# Patient Record
Sex: MALE | Race: BLACK OR AFRICAN AMERICAN | Employment: UNEMPLOYED | URBAN - METROPOLITAN AREA
[De-identification: names, ages, dates, MRNs, and addresses within clinical notes are randomized per-mention and may not be internally consistent; named-entity substitution may affect disease eponyms.]

---
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
## 2021-04-17 ENCOUNTER — HOSPITAL ENCOUNTER (EMERGENCY)
Age: 21
Discharge: HOME | End: 2021-04-17
Attending: EMERGENCY MEDICINE
Payer: COMMERCIAL

## 2021-04-17 VITALS
DIASTOLIC BLOOD PRESSURE: 82 MMHG | TEMPERATURE: 98.2 F | RESPIRATION RATE: 18 BRPM | BODY MASS INDEX: 39.87 KG/M2 | WEIGHT: 254 LBS | HEIGHT: 67 IN | HEART RATE: 64 BPM | OXYGEN SATURATION: 100 % | SYSTOLIC BLOOD PRESSURE: 144 MMHG

## 2021-04-17 DIAGNOSIS — J45.20 MILD INTERMITTENT ASTHMA, UNSPECIFIED WHETHER COMPLICATED: ICD-10-CM

## 2021-04-17 DIAGNOSIS — J30.9 CHRONIC ALLERGIC RHINITIS: Primary | ICD-10-CM

## 2021-04-17 PROCEDURE — 99283 EMERGENCY DEPT VISIT LOW MDM: CPT

## 2021-04-17 RX ORDER — FLUTICASONE PROPIONATE 50 MCG
1 SPRAY, SUSPENSION (ML) NASAL DAILY
COMMUNITY

## 2021-04-17 NOTE — ED PROVIDER NOTES
[address] [facility]          ATTENDING PHYSICIAN NOTE       Date of evaluation: 4/17/2021    Chief Complaint     Other (c/o allergy symptoms. does not think his current medications work )      History of Present Illness     Sonja Castillo is a 21 y.o. male who presents the emergency department with complaints of nasal congestion. Patient reports approximately yearly bouts of severe nasal congestion rhinorrhea as well as difficulty breathing which he relates to asthma. Patient reports has been taking Flonase as well as cetirizine and his albuterol inhaler with limited relief of his symptoms. Denies any new or recent illnesses denies any chest pain with exertion he has complaints of primarily of nasal congestion difficulty sleeping at night secondary to his nasal congestion    Review of Systems     As documented in the HPI, otherwise all other systems were reviewed and were negative. Past Medical, Surgical, Family, and Social History     He has a past medical history of Asthma. He has no past surgical history on file. His family history is not on file. He reports that he has never smoked. He has never used smokeless tobacco. He reports current drug use. Drug: Marijuana. He reports that he does not drink alcohol. Medications     Previous Medications    ALBUTEROL (ACCUNEB) 1.25 MG/3ML NEBULIZER SOLUTION    Inhale 1 ampule into the lungs every 6 hours as needed for Wheezing    CETIRIZINE HCL (ZYRTEC PO)    Take by mouth    FLUTICASONE (FLONASE) 50 MCG/ACT NASAL SPRAY    1 spray by Each Nostril route daily    LORATADINE-PSEUDOEPHEDRINE (CLARITIN-D 12 HOUR) 5-120 MG PER EXTENDED RELEASE TABLET    Take 1 tablet by mouth 2 times daily    NAPROXEN (NAPROSYN) 500 MG TABLET    Take 1 tablet by mouth 2 times daily for 20 doses       Allergies     He has No Known Allergies.     Physical Exam     INITIAL VITALS: BP: (!) 144/82, Temp: 98.2 °F (36.8 °C), Pulse: 64, Resp: 18, SpO2: 100 % General: 80-year-old male sitting in bed no apparent cardiorespiratory distress  HEENT:  head is atraumatic, pupils equal round and reactive to light, sclera are clear, oropharynx is nonerythematous, there is hyperemia of the nasal turbinates bilaterally right worse than left the tympanic membranes are clear bilaterally without significant erythema or bulging  Neck: supple, no lymphadenopathy  Chest: nonlabored respirations, equal chest rise bilaterally, no accessory muscle use, clear to auscultation bilaterally without wheezes rhonchi or rales  Cardiovascular: Regular, rate, and rhythm, 2+ radial pulses bilaterally, capillary refill 2 seconds  Abdominal: Soft, nontender, nondistended, positive bowel sounds throughout, no rebound or guarding  Skin: Warm, dry well perfused, no rashes  Musculoskeletal: no obvious deformities, no tenderness to palpation diffusely  Neurologic:  alert and oriented x4, speech is clear and intact without dysarthria, gait is intact    Diagnostic Results     RADIOLOGY:  No orders to display       LABS:   No results found for this visit on 04/17/21. RECENT VITALS:  BP: (!) 144/82, Temp: 98.2 °F (36.8 °C), Pulse: 64, Resp: 18, SpO2: 100 %       ED Course     Nursing Notes, Past Medical Hx, Past Surgical Hx, Social Hx, Allergies, and Family Hx were reviewed. The patient was given the following medications:  Orders Placed This Encounter   Medications    beclomethasone (QVAR) 80 MCG/ACT inhaler     Sig: Inhale 1 puff into the lungs 2 times daily     Dispense:  1 Inhaler     Refill:  3       CONSULTS:  None    MEDICAL DECISION MAKING / ASSESSMENT / Soila Nona is a 21 y.o. male who presented to the emergency department with a complaint of chronic nasal congestion and recurrent nasal congestion during the spring. Also complains of some difficulty breathing which he related is being secondary to asthma symptoms.   Was not actively experiencing difficulty breathing while here in

## 2021-04-17 NOTE — ED NOTES
.Patient prepared for and ready to be discharged. Patient discharged at this time in no acute distress after verbalizing understanding of discharge instructions. Patient left after receiving After Visit Summary instructions.       Robert Delgado RN  04/17/21 7038

## 2021-10-06 ENCOUNTER — HOSPITAL ENCOUNTER (EMERGENCY)
Age: 21
Discharge: HOME | End: 2021-10-06
Attending: EMERGENCY MEDICINE
Payer: COMMERCIAL

## 2021-10-06 VITALS
SYSTOLIC BLOOD PRESSURE: 134 MMHG | HEIGHT: 67 IN | BODY MASS INDEX: 39.55 KG/M2 | WEIGHT: 252 LBS | DIASTOLIC BLOOD PRESSURE: 75 MMHG | HEART RATE: 65 BPM | OXYGEN SATURATION: 99 % | TEMPERATURE: 99.1 F | RESPIRATION RATE: 16 BRPM

## 2021-10-06 DIAGNOSIS — R09.81 NASAL CONGESTION: Primary | ICD-10-CM

## 2021-10-06 DIAGNOSIS — J30.89 SEASONAL ALLERGIC RHINITIS DUE TO OTHER ALLERGIC TRIGGER: ICD-10-CM

## 2021-10-06 PROCEDURE — 99283 EMERGENCY DEPT VISIT LOW MDM: CPT

## 2021-10-06 RX ORDER — CETIRIZINE HYDROCHLORIDE 10 MG/1
10 TABLET ORAL DAILY
Qty: 30 TABLET | Refills: 1 | Status: SHIPPED | OUTPATIENT
Start: 2021-10-06 | End: 2021-12-05

## 2021-10-06 ASSESSMENT — PAIN SCALES - GENERAL: PAINLEVEL_OUTOF10: 0

## 2021-10-06 NOTE — ED PROVIDER NOTES
[facility]          ATTENDING PHYSICIAN NOTE       Date of evaluation: 10/6/2021    Chief Complaint     Nasal Congestion and Medication Refill (Zyrtec)      History of Present Illness     Heather Matias is a 24 y.o. male who presents with nasal congestion and some wheezing. Patient dates for the past week or so has had some nasal congestion which is typical for him with his seasonal allergies, he is also noted some wheezing and coughing which is been using his albuterol inhaler for with relief. No fevers noted. No known exposures to infections. Normal taste and smell. Patient is a smoker. Able to sleep at night but occasional coughing. Review of Systems     Review of Systems--positive for cough. Positive for wheezing. Negative for fevers or chills. Negative for change in taste or smell. Negative for productive cough of bloody or tinted sputum    Past Medical, Surgical, Family, and Social History     He has a past medical history of Asthma. He has no past surgical history on file. His family history is not on file. He reports that he has never smoked. He has never used smokeless tobacco. He reports current drug use. Drug: Marijuana. He reports that he does not drink alcohol. Medications     Previous Medications    ALBUTEROL (ACCUNEB) 1.25 MG/3ML NEBULIZER SOLUTION    Inhale 1 ampule into the lungs every 6 hours as needed for Wheezing    BECLOMETHASONE (QVAR) 80 MCG/ACT INHALER    Inhale 1 puff into the lungs 2 times daily    CETIRIZINE HCL (ZYRTEC PO)    Take by mouth    FLUTICASONE (FLONASE) 50 MCG/ACT NASAL SPRAY    1 spray by Each Nostril route daily    LORATADINE-PSEUDOEPHEDRINE (CLARITIN-D 12 HOUR) 5-120 MG PER EXTENDED RELEASE TABLET    Take 1 tablet by mouth 2 times daily    NAPROXEN (NAPROSYN) 500 MG TABLET    Take 1 tablet by mouth 2 times daily for 20 doses       Allergies     He has No Known Allergies.     Physical Exam     INITIAL VITALS: BP: 134/75, Temp: 99.1 °F (37.3 °C), Pulse: 65, Resp: 16, SpO2: 99 %   Physical Exam  General--sitting up in bed, no respiratory distress  HEENT--no signs of trauma, pupils are equal round react light, oropharynx is clear and moist without any posterior pharyngeal swelling, there is some nasal rhinorrhea but no purulent discharge noted  Neck--good range of motion, trachea midline  Chest--equal breath sounds bilaterally, no wheezing noted  Heart--regular rate and rhythm, not tachycardic, no murmurs  Abdomen--soft, no focal pain on palpation  Extremities--no deformities, moves all 4 equally  Neuro--alert, oriented, clear speech, no facial asymmetry, no focal weakness  Psych--appropriate affect and mood  Diagnostic Results       RADIOLOGY:  No orders to display       LABS:   No results found for this visit on 10/06/21. RECENT VITALS:  BP: 134/75,Temp: 99.1 °F (37.3 °C), Pulse: 65, Resp: 16, SpO2: 99 %     Procedures         ED Course     Nursing Notes, Past Medical Hx, Past Surgical Hx, Social Hx,Allergies, and Family Hx were reviewed. patient was given the following medications:  No orders of the defined types were placed in this encounter. CONSULTS:  None    MEDICAL DECISIONMAKING / ASSESSMENT / PLAN     Stephanie Sharma is a 24 y.o. male comes in with nasal congestion and occasional wheezing. He has no signs or symptoms of upper respiratory infection at this time, no fevers, no loss of taste or smell. He is requesting to put back on his certrizine, I do not think he needs prednisone at this time, we will keep him off work for 1 day and return to work on Thursday. Based on exam vital signs of like a chest x-ray is necessary or antibiotics. Clinical Impression     Seasonal rhinitis    Seasonal allergies    Disposition     PATIENT REFERRED TO:  No follow-up provider specified.     DISCHARGE MEDICATIONS:  New Prescriptions    No medications on file       [address] MD  10/06/21 5725

## 2021-10-06 NOTE — ED TRIAGE NOTES
Pt c/o mild asthma and seasonal allergies lately. Seen yesterday at urgent care and received an inhaler prescription and allegra. He spoke with the NP at urgent care, requesting Zyrtec, and was told he would have to see a doctor for that.

## 2021-10-06 NOTE — Clinical Note
Allyson Mae was seen and treated in our emergency department on 10/6/2021. He may return to work on 10/07/2021. If you have any questions or concerns, please don't hesitate to call.       Sammie Arboleda MD

## 2022-02-24 ENCOUNTER — HOSPITAL ENCOUNTER (EMERGENCY)
Age: 22
Discharge: HOME | End: 2022-02-24
Attending: STUDENT IN AN ORGANIZED HEALTH CARE EDUCATION/TRAINING PROGRAM
Payer: COMMERCIAL

## 2022-02-24 VITALS
HEIGHT: 67 IN | WEIGHT: 252 LBS | OXYGEN SATURATION: 100 % | TEMPERATURE: 98.8 F | HEART RATE: 84 BPM | SYSTOLIC BLOOD PRESSURE: 141 MMHG | RESPIRATION RATE: 18 BRPM | BODY MASS INDEX: 39.55 KG/M2 | DIASTOLIC BLOOD PRESSURE: 79 MMHG

## 2022-02-24 DIAGNOSIS — J45.909 ASTHMA, UNSPECIFIED ASTHMA SEVERITY, UNSPECIFIED WHETHER COMPLICATED, UNSPECIFIED WHETHER PERSISTENT: Primary | ICD-10-CM

## 2022-02-24 PROCEDURE — 99283 EMERGENCY DEPT VISIT LOW MDM: CPT

## 2022-02-24 RX ORDER — ALBUTEROL SULFATE 90 UG/1
2 AEROSOL, METERED RESPIRATORY (INHALATION) 4 TIMES DAILY PRN
Qty: 54 G | Refills: 1 | Status: SHIPPED | OUTPATIENT
Start: 2022-02-24 | End: 2022-06-29 | Stop reason: SDUPTHER

## 2022-02-24 ASSESSMENT — ENCOUNTER SYMPTOMS
ABDOMINAL DISTENTION: 0
SINUS PAIN: 0
SINUS PRESSURE: 0
ALLERGIC/IMMUNOLOGIC NEGATIVE: 1
SHORTNESS OF BREATH: 0
COUGH: 0
DIARRHEA: 0
RHINORRHEA: 0
SORE THROAT: 0
BACK PAIN: 0
NAUSEA: 0
CONSTIPATION: 0
VOMITING: 0
CHEST TIGHTNESS: 0
ABDOMINAL PAIN: 0
WHEEZING: 0

## 2022-02-24 NOTE — Clinical Note
Josephine Carvalho was seen and treated in our emergency department on 2/24/2022. He may return to work on 02/25/2022. If you have any questions or concerns, please don't hesitate to call.       Susan Cobb, DO

## 2022-02-25 NOTE — ED PROVIDER NOTES
[address] [facility] RESIDENT NOTE       Date of evaluation: 2/24/2022    Chief Complaint     Asthma (having astma issues states was weezing states that he used the last of his inhaler. )      History of Present Illness     Jim Henriquez is a 24 y.o. male who presents with asthma. Patient states he has shortness of breath, however when he was inhaler usually resolves appropriately. Patient was feeling dyspneic earlier today however he uses inhaler a few hours before arriving. Upon presentation to the ED his shortness of breath is resolved, however he has run out of his inhaler today. At this time patient is not complaining of shortness of breath and is breathing fine. Patient denies chest pain, abdominal pain, nausea/vomiting, constipation/diarrhea, urinary/bladder symptoms. Review of Systems     Review of Systems   Constitutional: Negative for chills, fatigue and fever. HENT: Negative. Negative for congestion, ear pain, postnasal drip, rhinorrhea, sinus pressure, sinus pain, sneezing and sore throat. Respiratory: Negative for cough, chest tightness, shortness of breath and wheezing. Cardiovascular: Negative for chest pain, palpitations and leg swelling. Gastrointestinal: Negative for abdominal distention, abdominal pain, constipation, diarrhea, nausea and vomiting. Endocrine: Negative for polyuria. Genitourinary: Negative for difficulty urinating, dysuria, flank pain, hematuria, penile pain and testicular pain. Musculoskeletal: Negative for arthralgias, back pain, myalgias, neck pain and neck stiffness. Skin: Negative. Allergic/Immunologic: Negative. Neurological: Negative for dizziness, seizures, weakness and numbness. Hematological: Negative. Psychiatric/Behavioral: Negative. Past Medical, Surgical, Family, and Social History     He has a past medical history of Asthma. He has no past surgical history on file.   His family history is not on file. He reports that he has never smoked. He has never used smokeless tobacco. He reports current drug use. Drug: Marijuana Lanis Ayaka). He reports that he does not drink alcohol. Medications     Previous Medications    ALBUTEROL (ACCUNEB) 1.25 MG/3ML NEBULIZER SOLUTION    Inhale 1 ampule into the lungs every 6 hours as needed for Wheezing    BECLOMETHASONE (QVAR) 80 MCG/ACT INHALER    Inhale 1 puff into the lungs 2 times daily    CETIRIZINE HCL (ZYRTEC PO)    Take by mouth    FLUTICASONE (FLONASE) 50 MCG/ACT NASAL SPRAY    1 spray by Each Nostril route daily    LORATADINE-PSEUDOEPHEDRINE (CLARITIN-D 12 HOUR) 5-120 MG PER EXTENDED RELEASE TABLET    Take 1 tablet by mouth 2 times daily    NAPROXEN (NAPROSYN) 500 MG TABLET    Take 1 tablet by mouth 2 times daily for 20 doses       Allergies     He has No Known Allergies. Physical Exam     INITIAL VITALS: BP: (!) 141/79, Temp: 98.8 °F (37.1 °C), Pulse: 84, Resp: 18, SpO2: 100 %   Physical Exam  Constitutional:       General: He is not in acute distress. Appearance: He is obese. He is not ill-appearing, toxic-appearing or diaphoretic. HENT:      Head: Normocephalic and atraumatic. Right Ear: External ear normal.      Left Ear: External ear normal.      Nose: Nose normal.      Mouth/Throat:      Mouth: Mucous membranes are moist.      Pharynx: Oropharynx is clear. Eyes:      General: No scleral icterus. Right eye: No discharge. Left eye: No discharge. Extraocular Movements: Extraocular movements intact. Pupils: Pupils are equal, round, and reactive to light. Cardiovascular:      Rate and Rhythm: Normal rate and regular rhythm. Heart sounds: Normal heart sounds. No murmur heard. No friction rub. No gallop. Pulmonary:      Effort: Pulmonary effort is normal. No respiratory distress. Breath sounds: Normal breath sounds. No stridor. No wheezing, rhonchi or rales.    Abdominal:      General: Bowel sounds are normal. There is no distension. Palpations: Abdomen is soft. There is no mass. Tenderness: There is no abdominal tenderness. There is no guarding or rebound. Hernia: No hernia is present. Musculoskeletal:      Cervical back: Normal range of motion. Right lower leg: No edema. Left lower leg: No edema. Skin:     General: Skin is warm and dry. Coloration: Skin is not jaundiced or pale. Neurological:      Mental Status: He is alert and oriented to person, place, and time. Mental status is at baseline. Psychiatric:         Mood and Affect: Mood normal.         Behavior: Behavior normal.         Thought Content: Thought content normal.         Judgment: Judgment normal.         Diagnostic Results     EKG       RADIOLOGY:  No orders to display       LABS:   No results found for this visit on 02/24/22. ED BEDSIDE ULTRASOUND:      RECENT VITALS:  BP: (!) 141/79, Temp: 98.8 °F (37.1 °C),Pulse: 84, Resp: 18, SpO2: 100 %     Procedures         ED Course     Nursing Notes, Past Medical Hx, Past Surgical Hx, Social Hx, Allergies, and FamilyHx were reviewed. The patient was giventhe following medications:  Orders Placed This Encounter   Medications    albuterol sulfate HFA (VENTOLIN HFA) 108 (90 Base) MCG/ACT inhaler     Sig: Inhale 2 puffs into the lungs 4 times daily as needed for Wheezing     Dispense:  54 g     Refill:  1       CONSULTS:  None    MEDICAL DECISION MAKING / ASSESSMENT / August Mayer is a 24 y.o. male who presented to the ED with asthma. Patient has shortness of breath, and uses inhaler as needed before presenting to the ED. Upon presentation his shortness of breath and wheezing has resolved. Patient states that he has run out of his inhaler, needs a prescription for additional albuterol inhaler. On presentation to the ED, vital signs are stable.   Patient is afebrile, heart rate 84, respiratory rate 18, satting 100% on room air, mildly hypertensive to 141/79. On physical exam patient is without wheezing or shortness of breath. When offered a breathing treatment, patient refuses. Patient does ask for a prescription for albuterol as well as a work note which was provided. Patient given a prescription for albuterol inhaler, as well as a work note to return to work tomorrow 2/25/2022. If patient's shortness of breath continues or significantly worsens despite albuterol inhaler use, patient is instructed to return to the ED for further evaluation and management. This patient was also evaluated by the attending physician. All care plans were discussed and agreed upon. Clinical Impression     1. Asthma, unspecified asthma severity, unspecified whether complicated, unspecified whether persistent        Disposition     PATIENT REFERRED TO:  No follow-up provider specified.     DISCHARGE MEDICATIONS:  New Prescriptions    ALBUTEROL SULFATE HFA (VENTOLIN HFA) 108 (90 BASE) MCG/ACT INHALER    Inhale 2 puffs into the lungs 4 times daily as needed for Wheezing       DISPOSITION    Discharge home            Dagoberto Tony,   Resident  02/24/22 3868

## 2022-02-25 NOTE — ED PROVIDER NOTES
ED Attending Attestation Note     Date of evaluation: 2/24/2022    This patient was seen by the resident. I have seen and examined the patient, agree with the workup, evaluation, management and diagnosis. The care plan has been discussed. My assessment reveals a 26-year-old male presenting with chief complaint of an asthma exacerbation. He sees his inhaler prior to coming however is out of puffs in his inhaler and is requesting a refill.   On physical exam no wheezing, pulse ox 98 to 100% at rest and with exertion, will discharge with butyryl inhaler prescription, patient requests no further treatment or work-up at this time     Letty Gonzalez MD  02/24/22 6464

## 2022-06-29 ENCOUNTER — HOSPITAL ENCOUNTER (EMERGENCY)
Age: 22
Discharge: HOME | End: 2022-06-29
Payer: COMMERCIAL

## 2022-06-29 VITALS
WEIGHT: 252 LBS | OXYGEN SATURATION: 100 % | SYSTOLIC BLOOD PRESSURE: 128 MMHG | HEIGHT: 68 IN | RESPIRATION RATE: 14 BRPM | BODY MASS INDEX: 38.19 KG/M2 | HEART RATE: 59 BPM | TEMPERATURE: 98.5 F | DIASTOLIC BLOOD PRESSURE: 71 MMHG

## 2022-06-29 DIAGNOSIS — K01.1 IMPACTED TOOTH: ICD-10-CM

## 2022-06-29 DIAGNOSIS — K04.7 DENTAL INFECTION: Primary | ICD-10-CM

## 2022-06-29 PROCEDURE — 99283 EMERGENCY DEPT VISIT LOW MDM: CPT

## 2022-06-29 RX ORDER — AMOXICILLIN 500 MG/1
500 CAPSULE ORAL 2 TIMES DAILY
Qty: 14 CAPSULE | Refills: 0 | Status: SHIPPED | OUTPATIENT
Start: 2022-06-29 | End: 2022-07-06

## 2022-06-29 RX ORDER — IBUPROFEN 800 MG/1
800 TABLET ORAL EVERY 8 HOURS PRN
Qty: 30 TABLET | Refills: 0 | Status: SHIPPED | OUTPATIENT
Start: 2022-06-29

## 2022-06-29 RX ORDER — ALBUTEROL SULFATE 90 UG/1
2 AEROSOL, METERED RESPIRATORY (INHALATION) 4 TIMES DAILY PRN
Qty: 54 G | Refills: 0 | Status: SHIPPED | OUTPATIENT
Start: 2022-06-29

## 2022-06-29 ASSESSMENT — PAIN DESCRIPTION - ONSET: ONSET: PROGRESSIVE

## 2022-06-29 ASSESSMENT — PAIN DESCRIPTION - ORIENTATION: ORIENTATION: LEFT;LOWER;POSTERIOR

## 2022-06-29 ASSESSMENT — ENCOUNTER SYMPTOMS
SORE THROAT: 0
SINUS PAIN: 0
CHEST TIGHTNESS: 0
FACIAL SWELLING: 1
SINUS PRESSURE: 0
SHORTNESS OF BREATH: 0

## 2022-06-29 ASSESSMENT — PAIN DESCRIPTION - LOCATION: LOCATION: TEETH

## 2022-06-29 ASSESSMENT — PAIN DESCRIPTION - DESCRIPTORS: DESCRIPTORS: THROBBING

## 2022-06-29 ASSESSMENT — PAIN - FUNCTIONAL ASSESSMENT
PAIN_FUNCTIONAL_ASSESSMENT: PREVENTS OR INTERFERES SOME ACTIVE ACTIVITIES AND ADLS
PAIN_FUNCTIONAL_ASSESSMENT: 0-10

## 2022-06-29 ASSESSMENT — PAIN DESCRIPTION - FREQUENCY: FREQUENCY: INTERMITTENT

## 2022-06-29 ASSESSMENT — PAIN DESCRIPTION - PAIN TYPE: TYPE: ACUTE PAIN

## 2022-06-29 ASSESSMENT — PAIN SCALES - GENERAL: PAINLEVEL_OUTOF10: 9

## 2022-06-29 NOTE — ED TRIAGE NOTES
Pt presents to ED for tooth pain in lower left jaw and is concerned for infection. Pain started approx 4 days ago.

## 2022-06-29 NOTE — ED NOTES
Discharge instructions and prescriptions reviewed with patient. Pt verbalized understanding. Pt discharged home by self.        Julito Art RN  06/29/22 1038

## 2022-06-29 NOTE — ED PROVIDER NOTES
[address] ENCOUNTER          PHYSICIAN ASSISTANT NOTE       Date of evaluation: 6/29/2022    Chief Complaint     Dental Pain (lower left)    History of Present Illness     Ignacio Diaz is a 25 y.o. male presenting to the emergency department for evaluation of left lower dental pain and jaw pain. Symptoms started about 4 days ago with pain in his left lower wisdom tooth. He believes that his wisdom teeth are trying to come in, and is concerned that 1 may be infected. He has had pain like this in the past, that resolved after taking ibuprofen and only lasted a few days. Due to the persistence and developing left-sided jaw swelling he decided to come to the emergency department for evaluation. No associated fevers or chills. No trauma or injury. He has no difficulty opening his mouth, maintaining his secretions, no shortness of breath. Did try to call a dentist, but was not able to get an appointment for about 4 months. Review of Systems     Review of Systems   Constitutional: Negative for chills, diaphoresis, fatigue and fever. HENT: Positive for dental problem and facial swelling. Negative for congestion, sinus pressure, sinus pain and sore throat. Respiratory: Negative for chest tightness and shortness of breath. Cardiovascular: Negative for chest pain. Neurological: Positive for headaches. Negative for dizziness. Psychiatric/Behavioral: Negative for confusion. Past Medical, Surgical, Family, and Social History     He has a past medical history of Asthma. He has no past surgical history on file. His family history is not on file. He reports that he has never smoked. He has never used smokeless tobacco. He reports current drug use. Drug: Marijuana Jing Kwon). He reports that he does not drink alcohol.     Medications     Discharge Medication List as of 6/29/2022 10:32 AM      CONTINUE these medications which have NOT CHANGED    Details   fluticasone (FLONASE) 50 MCG/ACT nasal spray 1 spray by Each Nostril route dailyHistorical Med      Cetirizine HCl (ZYRTEC PO) Take by mouthHistorical Med      beclomethasone (QVAR) 80 MCG/ACT inhaler Inhale 1 puff into the lungs 2 times daily, Disp-1 Inhaler, R-3Print      albuterol (ACCUNEB) 1.25 MG/3ML nebulizer solution Inhale 1 ampule into the lungs every 6 hours as needed for WheezingHistorical Med      loratadine-pseudoephedrine (CLARITIN-D 12 HOUR) 5-120 MG per extended release tablet Take 1 tablet by mouth 2 times daily, Disp-20 tablet, R-0Print      naproxen (NAPROSYN) 500 MG tablet Take 1 tablet by mouth 2 times daily for 20 doses, Disp-20 tablet, R-0Print             Allergies     He has No Known Allergies. Physical Exam     INITIAL VITALS: BP: 128/71, Temp: 98.5 °F (36.9 °C), Heart Rate: 59, Resp: 14, SpO2: 100 %  Physical Exam  Constitutional:       General: He is not in acute distress. Appearance: Normal appearance. He is normal weight. He is not ill-appearing, toxic-appearing or diaphoretic. HENT:      Head: Normocephalic and atraumatic. Nose: No congestion or rhinorrhea. Comments: Partially impacted left lower wisdom tooth with surrounding gingival erythema with no induration or evidence of periapical abscess, small amount of soft tissue swelling extending into the buccal space/mandible with no trismus, no swelling extending into the neck, no palpable fluid collection  Posterior oropharynx visualized and normal, uvula is midline, airways patent, maintaining secretions appropriately  Cardiovascular:      Rate and Rhythm: Normal rate. Pulses: Normal pulses. Pulmonary:      Effort: Pulmonary effort is normal.      Breath sounds: Normal breath sounds. Musculoskeletal:         General: Normal range of motion. Skin:     General: Skin is warm. Neurological:      General: No focal deficit present. Mental Status: He is alert.    Psychiatric:         Mood and Affect: Mood normal. Behavior: Behavior normal.         Diagnostic Results     RADIOLOGY:  No orders to display       LABS:   No results found for this visit on 06/29/22. ED BEDSIDE ULTRASOUND:  No results found. RECENT VITALS:  BP: 128/71, Temp: 98.5 °F (36.9 °C), Heart Rate: 59, Resp: 14, SpO2: 100 %     Procedures   None    ED Course     Nursing Notes, Past Medical Hx,Past Surgical Hx, Social Hx, Allergies, and Family Hx were reviewed. The patient was given the following medications:  Orders Placed This Encounter   Medications    amoxicillin (AMOXIL) 500 MG capsule     Sig: Take 1 capsule by mouth 2 times daily for 7 days     Dispense:  14 capsule     Refill:  0    ibuprofen (ADVIL;MOTRIN) 800 MG tablet     Sig: Take 1 tablet by mouth every 8 hours as needed for Pain (Take with food)     Dispense:  30 tablet     Refill:  0    albuterol sulfate HFA (VENTOLIN HFA) 108 (90 Base) MCG/ACT inhaler     Sig: Inhale 2 puffs into the lungs 4 times daily as needed for Wheezing     Dispense:  54 g     Refill:  0       CONSULTS:  None    MEDICAL DECISION MAKING / ASSESSMENT / Arnold Mae is a 25 y.o. male presenting to the emergency department for evaluation and concern of dental infection dental pain. Symptoms started 4 days ago. Symptoms are located at the left lower wisdom with associated very mild left-sided jaw swelling. Based off of history and physical exam, do have clinical concern for localized dental infection. No abscess amenable to incision and drainage procedure today. Vitals were stable and he was afebrile with no findings to suggest systemic infection. No airway compromise, no extension of swelling into the neck and nothing on exam to suggest Ludewig's angina. He will be treated for local dental infection with a course of amoxicillin, given ibuprofen for symptom control, and given a dental clinic list to help facilitate outpatient follow-up.   He will likely need an oral surgeon for partially impacted wisdom tooth with local infection, and was given resources to help initiate this follow-up as well. She is comfortable with plan for discharge home in good condition on course of antibiotics. He understands plan as well as return precautions. Was seen and evaluated independently by KILLIAN. Clinical Impression     1. Dental infection    2.  Impacted tooth        Disposition     PATIENT REFERRED TO:  [facility]  [address] [address]  [phone]            DISCHARGE MEDICATIONS:  Discharge Medication List as of 6/29/2022 10:32 AM      START taking these medications    Details   amoxicillin (AMOXIL) 500 MG capsule Take 1 capsule by mouth 2 times daily for 7 days, Disp-14 capsule, R-0Print      ibuprofen (ADVIL;MOTRIN) 800 MG tablet Take 1 tablet by mouth every 8 hours as needed for Pain (Take with food), Disp-30 tablet, R-0Print             DISPOSITION Decision To Discharge 06/29/2022 10:15:06 AM        Gibran Couch PA-C  06/29/22 1052

## 2023-06-17 ENCOUNTER — HOSPITAL ENCOUNTER (EMERGENCY)
Age: 23
Discharge: HOME | End: 2023-06-17
Attending: EMERGENCY MEDICINE
Payer: COMMERCIAL

## 2023-06-17 ENCOUNTER — APPOINTMENT (OUTPATIENT)
Dept: GENERAL RADIOLOGY | Age: 23
End: 2023-06-17
Payer: COMMERCIAL

## 2023-06-17 VITALS
HEIGHT: 68 IN | DIASTOLIC BLOOD PRESSURE: 92 MMHG | OXYGEN SATURATION: 98 % | BODY MASS INDEX: 39.92 KG/M2 | SYSTOLIC BLOOD PRESSURE: 156 MMHG | WEIGHT: 263.4 LBS | TEMPERATURE: 98.6 F | HEART RATE: 84 BPM | RESPIRATION RATE: 18 BRPM

## 2023-06-17 DIAGNOSIS — B34.9 VIRAL SYNDROME: Primary | ICD-10-CM

## 2023-06-17 LAB
ANION GAP SERPL CALCULATED.3IONS-SCNC: 11 MMOL/L (ref 3–16)
BUN SERPL-MCNC: 7 MG/DL (ref 7–20)
CALCIUM SERPL-MCNC: 8.8 MG/DL (ref 8.3–10.6)
CHLORIDE SERPL-SCNC: 104 MMOL/L (ref 99–110)
CO2 SERPL-SCNC: 22 MMOL/L (ref 21–32)
CREAT SERPL-MCNC: 1 MG/DL (ref 0.9–1.3)
FLUAV RNA RESP QL NAA+PROBE: NOT DETECTED
FLUBV RNA RESP QL NAA+PROBE: NOT DETECTED
GFR SERPLBLD CREATININE-BSD FMLA CKD-EPI: >60 ML/MIN/{1.73_M2}
GLUCOSE SERPL-MCNC: 98 MG/DL (ref 70–99)
POTASSIUM SERPL-SCNC: 3.7 MMOL/L (ref 3.5–5.1)
SARS-COV-2 RNA RESP QL NAA+PROBE: NOT DETECTED
SODIUM SERPL-SCNC: 137 MMOL/L (ref 136–145)

## 2023-06-17 PROCEDURE — 6360000002 HC RX W HCPCS: Performed by: EMERGENCY MEDICINE

## 2023-06-17 PROCEDURE — 99284 EMERGENCY DEPT VISIT MOD MDM: CPT

## 2023-06-17 PROCEDURE — 96374 THER/PROPH/DIAG INJ IV PUSH: CPT

## 2023-06-17 PROCEDURE — 87636 SARSCOV2 & INF A&B AMP PRB: CPT

## 2023-06-17 PROCEDURE — 80048 BASIC METABOLIC PNL TOTAL CA: CPT

## 2023-06-17 PROCEDURE — 71045 X-RAY EXAM CHEST 1 VIEW: CPT

## 2023-06-17 PROCEDURE — 2580000003 HC RX 258: Performed by: EMERGENCY MEDICINE

## 2023-06-17 RX ORDER — ALBUTEROL SULFATE 90 UG/1
2 AEROSOL, METERED RESPIRATORY (INHALATION) 4 TIMES DAILY PRN
Qty: 18 G | Refills: 0 | Status: SHIPPED | OUTPATIENT
Start: 2023-06-17 | End: 2023-06-17 | Stop reason: SDUPTHER

## 2023-06-17 RX ORDER — 0.9 % SODIUM CHLORIDE 0.9 %
1000 INTRAVENOUS SOLUTION INTRAVENOUS ONCE
Status: COMPLETED | OUTPATIENT
Start: 2023-06-17 | End: 2023-06-17

## 2023-06-17 RX ORDER — ALBUTEROL SULFATE 90 UG/1
2 AEROSOL, METERED RESPIRATORY (INHALATION) 4 TIMES DAILY PRN
Qty: 18 G | Refills: 0 | Status: SHIPPED | OUTPATIENT
Start: 2023-06-17

## 2023-06-17 RX ORDER — KETOROLAC TROMETHAMINE 30 MG/ML
15 INJECTION, SOLUTION INTRAMUSCULAR; INTRAVENOUS ONCE
Status: COMPLETED | OUTPATIENT
Start: 2023-06-17 | End: 2023-06-17

## 2023-06-17 RX ADMIN — SODIUM CHLORIDE 1000 ML: 9 INJECTION, SOLUTION INTRAVENOUS at 04:38

## 2023-06-17 RX ADMIN — KETOROLAC TROMETHAMINE 15 MG: 30 INJECTION, SOLUTION INTRAMUSCULAR; INTRAVENOUS at 04:40

## 2023-06-17 ASSESSMENT — PAIN SCALES - GENERAL
PAINLEVEL_OUTOF10: 1
PAINLEVEL_OUTOF10: 6

## 2023-06-17 ASSESSMENT — PAIN - FUNCTIONAL ASSESSMENT: PAIN_FUNCTIONAL_ASSESSMENT: 0-10

## 2023-06-17 NOTE — ED PROVIDER NOTES
with signs and symptoms most consistent with a viral illness. X-ray was obtained to evaluate for multifocal pneumonia or other intrathoracic pathology. Because of a close sick contact, he was screened for COVID and influenza, both of which are negative. Electrolytes and renal function are reassuring. I feel he can be treated safely as an outpatient with symptomatic management and close return precautions. Medical Decision Making  Problems Addressed:  Viral syndrome: acute illness or injury    Amount and/or Complexity of Data Reviewed  Labs: ordered. Decision-making details documented in ED Course. Radiology: ordered. Decision-making details documented in ED Course. Risk  OTC drugs. Prescription drug management. Clinical Impression     1.  Viral syndrome        Disposition     PATIENT REFERRED TO:  [facility] Emergency Department  [address] [address]  [address]  [phone]    As needed, If symptoms worsen      DISCHARGE MEDICATIONS:  Discharge Medication List as of 6/17/2023  6:33 AM      Albuterol MDI    DISPOSITION Decision To Discharge 06/17/2023 06:28:34 AM          Nikkie Schafer MD  06/17/23 1755

## 2023-06-17 NOTE — ED NOTES
Patient prepared for and ready to be discharged. Dressed in clothes and given belongings. IV removed, pt tolerated well, no complications. Patient discharged at this time in no acute distress after pt verbalized understanding of discharge instructions. Reviewed medications, and when to return to the ED with patient. Patient walked to [facility].      Shira Elliott RN  06/17/23 3147

## 2023-06-17 NOTE — DISCHARGE INSTRUCTIONS
Continue to use over-the-counter medications as needed to treat your symptoms. Stay hydrated. Come back to the emergency department for any new or worsening symptoms, any other urgent concerns.

## 2023-08-08 ENCOUNTER — HOSPITAL ENCOUNTER (EMERGENCY)
Age: 23
Discharge: HOME | End: 2023-08-08
Attending: EMERGENCY MEDICINE
Payer: COMMERCIAL

## 2023-08-08 VITALS
OXYGEN SATURATION: 99 % | DIASTOLIC BLOOD PRESSURE: 62 MMHG | WEIGHT: 268 LBS | SYSTOLIC BLOOD PRESSURE: 128 MMHG | BODY MASS INDEX: 40.75 KG/M2 | RESPIRATION RATE: 18 BRPM | TEMPERATURE: 98.6 F | HEART RATE: 81 BPM

## 2023-08-08 DIAGNOSIS — L03.011 CELLULITIS OF FINGER OF RIGHT HAND: Primary | ICD-10-CM

## 2023-08-08 PROCEDURE — 6370000000 HC RX 637 (ALT 250 FOR IP): Performed by: EMERGENCY MEDICINE

## 2023-08-08 PROCEDURE — 99283 EMERGENCY DEPT VISIT LOW MDM: CPT

## 2023-08-08 RX ORDER — CEPHALEXIN 500 MG/1
500 CAPSULE ORAL 4 TIMES DAILY
Qty: 28 CAPSULE | Refills: 0 | Status: SHIPPED | OUTPATIENT
Start: 2023-08-08 | End: 2023-08-15

## 2023-08-08 RX ORDER — SULFAMETHOXAZOLE AND TRIMETHOPRIM 800; 160 MG/1; MG/1
1 TABLET ORAL 2 TIMES DAILY
Qty: 20 TABLET | Refills: 0 | Status: SHIPPED | OUTPATIENT
Start: 2023-08-08 | End: 2023-08-08

## 2023-08-08 RX ORDER — CEPHALEXIN 500 MG/1
500 CAPSULE ORAL 4 TIMES DAILY
Qty: 28 CAPSULE | Refills: 0 | Status: SHIPPED | OUTPATIENT
Start: 2023-08-08 | End: 2023-08-08

## 2023-08-08 RX ORDER — SULFAMETHOXAZOLE AND TRIMETHOPRIM 800; 160 MG/1; MG/1
1 TABLET ORAL 2 TIMES DAILY
Qty: 20 TABLET | Refills: 0 | Status: SHIPPED | OUTPATIENT
Start: 2023-08-08 | End: 2023-08-18

## 2023-08-08 RX ORDER — CEPHALEXIN 500 MG/1
500 CAPSULE ORAL ONCE
Status: COMPLETED | OUTPATIENT
Start: 2023-08-08 | End: 2023-08-08

## 2023-08-08 RX ADMIN — CEPHALEXIN 500 MG: 500 CAPSULE ORAL at 21:12

## 2023-08-08 ASSESSMENT — PAIN - FUNCTIONAL ASSESSMENT: PAIN_FUNCTIONAL_ASSESSMENT: NONE - DENIES PAIN

## 2023-08-09 NOTE — DISCHARGE INSTRUCTIONS
Discharge home  Warm compresses  Bactrim  Keflex  Motrin over-the-counter for pain  Follow-up with orthopedics hand

## 2023-08-09 NOTE — ED PROVIDER NOTES
[address] [facility]  eMERGENCY dEPARTMENT eNCOUnter      Pt Name: Luca Hicks  MRN: [id number]  [address]  Date of evaluation: 8/8/2023  Provider: Yee Farrar MD  PCP: No primary care provider on file. CHIEF COMPLAINT       Swollen finger    HISTORY OFPRESENT ILLNESS   (Location/Symptom, Timing/Onset, Context/Setting, Quality, Duration, Modifying Factors,Severity)  Note limiting factors. Luca Hicks is a 21 y.o. male says that he was chewing his fingernails and he developed an area that is red and painful denies drainage of any purulent material he is not a diabetic    Nursing Notes were all reviewed and agreed with or any disagreements were addressed  in the HPI. REVIEW OF SYSTEMS    (2-9 systems for level 4, 10 or more for level 5)     Review of Systems    Positives and Pertinent negatives as per HPI. Except as noted above in the ROS, all other systems were reviewed andnegative. PASTMEDICAL HISTORY     Past Medical History:   Diagnosis Date    Asthma          SURGICAL HISTORY     No past surgical history on file. CURRENT MEDICATIONS       Previous Medications    ALBUTEROL SULFATE HFA (VENTOLIN HFA) 108 (90 BASE) MCG/ACT INHALER    Inhale 2 puffs into the lungs 4 times daily as needed for Wheezing    BECLOMETHASONE (QVAR) 80 MCG/ACT INHALER    Inhale 1 puff into the lungs 2 times daily    CETIRIZINE HCL (ZYRTEC PO)    Take by mouth    FLUTICASONE (FLONASE) 50 MCG/ACT NASAL SPRAY    1 spray by Each Nostril route daily    IBUPROFEN (ADVIL;MOTRIN) 800 MG TABLET    Take 1 tablet by mouth every 8 hours as needed for Pain (Take with food)    LORATADINE-PSEUDOEPHEDRINE (CLARITIN-D 12 HOUR) 5-120 MG PER EXTENDED RELEASE TABLET    Take 1 tablet by mouth 2 times daily    NAPROXEN (NAPROSYN) 500 MG TABLET    Take 1 tablet by mouth 2 times daily for 20 doses       ALLERGIES     Patient has no known allergies.     FAMILY HISTORY     No family history on

## 2023-08-09 NOTE — ED NOTES
Pt discharged to home. Discharge instructions discussed along with 2 prescriptions and medication education completed. Pt verbalized understanding, and will follow up as indicated. Pt ambulated to lobby without assistance. Pt is alert and oriented, respirations are even and unlabored.         Светлана Gardiner RN  08/08/23 3066

## 2024-12-21 ENCOUNTER — HOSPITAL ENCOUNTER (EMERGENCY)
Age: 24
Discharge: HOME | End: 2024-12-21
Attending: STUDENT IN AN ORGANIZED HEALTH CARE EDUCATION/TRAINING PROGRAM
Payer: COMMERCIAL

## 2024-12-21 VITALS
TEMPERATURE: 98.1 F | HEIGHT: 69 IN | DIASTOLIC BLOOD PRESSURE: 69 MMHG | BODY MASS INDEX: 41.06 KG/M2 | HEART RATE: 65 BPM | WEIGHT: 277.2 LBS | RESPIRATION RATE: 18 BRPM | OXYGEN SATURATION: 98 % | SYSTOLIC BLOOD PRESSURE: 133 MMHG

## 2024-12-21 DIAGNOSIS — M54.2 NECK PAIN: Primary | ICD-10-CM

## 2024-12-21 PROCEDURE — 6360000002 HC RX W HCPCS

## 2024-12-21 PROCEDURE — 99284 EMERGENCY DEPT VISIT MOD MDM: CPT

## 2024-12-21 PROCEDURE — 96372 THER/PROPH/DIAG INJ SC/IM: CPT

## 2024-12-21 PROCEDURE — 6370000000 HC RX 637 (ALT 250 FOR IP)

## 2024-12-21 RX ORDER — ALBUTEROL SULFATE 90 UG/1
2 INHALANT RESPIRATORY (INHALATION) EVERY 6 HOURS PRN
Qty: 18 G | Refills: 0 | Status: SHIPPED | OUTPATIENT
Start: 2024-12-21

## 2024-12-21 RX ORDER — IBUPROFEN 600 MG/1
600 TABLET, FILM COATED ORAL EVERY 8 HOURS PRN
Qty: 9 TABLET | Refills: 0 | Status: SHIPPED | OUTPATIENT
Start: 2024-12-21 | End: 2024-12-24

## 2024-12-21 RX ORDER — METHOCARBAMOL 500 MG/1
750 TABLET, FILM COATED ORAL ONCE
Status: COMPLETED | OUTPATIENT
Start: 2024-12-21 | End: 2024-12-21

## 2024-12-21 RX ORDER — METHOCARBAMOL 750 MG/1
750 TABLET, FILM COATED ORAL 4 TIMES DAILY
Qty: 28 TABLET | Refills: 0 | Status: SHIPPED | OUTPATIENT
Start: 2024-12-21 | End: 2024-12-28

## 2024-12-21 RX ORDER — ACETAMINOPHEN 325 MG/1
650 TABLET ORAL ONCE
Status: COMPLETED | OUTPATIENT
Start: 2024-12-21 | End: 2024-12-21

## 2024-12-21 RX ORDER — KETOROLAC TROMETHAMINE 30 MG/ML
15 INJECTION, SOLUTION INTRAMUSCULAR; INTRAVENOUS ONCE
Status: COMPLETED | OUTPATIENT
Start: 2024-12-21 | End: 2024-12-21

## 2024-12-21 RX ADMIN — KETOROLAC TROMETHAMINE 15 MG: 30 INJECTION INTRAMUSCULAR; INTRAVENOUS at 15:13

## 2024-12-21 RX ADMIN — ACETAMINOPHEN 650 MG: 325 TABLET ORAL at 15:13

## 2024-12-21 RX ADMIN — METHOCARBAMOL 750 MG: 500 TABLET ORAL at 15:13

## 2024-12-21 ASSESSMENT — PAIN SCALES - GENERAL: PAINLEVEL_OUTOF10: 6

## 2024-12-21 ASSESSMENT — PAIN DESCRIPTION - DESCRIPTORS: DESCRIPTORS: PRESSURE;TIGHTNESS

## 2024-12-21 ASSESSMENT — PAIN DESCRIPTION - ORIENTATION: ORIENTATION: POSTERIOR

## 2024-12-21 ASSESSMENT — PAIN DESCRIPTION - LOCATION: LOCATION: NECK

## 2024-12-21 ASSESSMENT — PAIN - FUNCTIONAL ASSESSMENT: PAIN_FUNCTIONAL_ASSESSMENT: 0-10

## 2024-12-21 NOTE — ED NOTES
mouth every 8 hours as needed for Pain     Dispense:  9 tablet     Refill:  0       CONSULTS:  None    Review of Systems     Review of Systems    Past Medical, Surgical, Family, and Social History     He has a past medical history of Asthma.  He has no past surgical history on file.  His family history is not on file.  He reports that he has been smoking cigars. He has never used smokeless tobacco. He reports current alcohol use. He reports current drug use. Drug: Marijuana (Weed).    Medications     Discharge Medication List as of 12/21/2024  4:09 PM        CONTINUE these medications which have NOT CHANGED    Details   !! albuterol sulfate HFA (VENTOLIN HFA) 108 (90 Base) MCG/ACT inhaler Inhale 2 puffs into the lungs 4 times daily as needed for Wheezing, Disp-18 g, R-0Print      !! ibuprofen (ADVIL;MOTRIN) 800 MG tablet Take 1 tablet by mouth every 8 hours as needed for Pain (Take with food), Disp-30 tablet, R-0Print      fluticasone (FLONASE) 50 MCG/ACT nasal spray 1 spray by Each Nostril route dailyHistorical Med      Cetirizine HCl (ZYRTEC PO) Take by mouthHistorical Med      beclomethasone (QVAR) 80 MCG/ACT inhaler Inhale 1 puff into the lungs 2 times daily, Disp-1 Inhaler, R-3Print      loratadine-pseudoephedrine (CLARITIN-D 12 HOUR) 5-120 MG per extended release tablet Take 1 tablet by mouth 2 times daily, Disp-20 tablet, R-0Print      naproxen (NAPROSYN) 500 MG tablet Take 1 tablet by mouth 2 times daily for 20 doses, Disp-20 tablet, R-0Print       !! - Potential duplicate medications found. Please discuss with provider.          Allergies     He has No Known Allergies.    Physical Exam     INITIAL VITALS: BP: 133/69, Temp: 98.1 °F (36.7 °C), Pulse: 65, Respirations: 18, SpO2: 98 %   Physical Exam  Constitutional:       General: He is not in acute distress.     Appearance: Normal appearance. He is not ill-appearing or toxic-appearing.   HENT:      Head: Normocephalic and atraumatic.      Nose: Nose normal.

## 2024-12-21 NOTE — DISCHARGE INSTRUCTIONS
You were evaluated in the ED for neck pain. We treated your pain and determined you were safe for discharge. We are prescribing you a muscle relaxer to help with your symptoms. Please follow up with your PCP in the next week.    Please return to the ED if you experience any loss of consciousness, severe worsening of headache, changes in vision, weakness, numbness, or any other concerning symptoms.

## 2024-12-21 NOTE — ED PROVIDER NOTES
ED Attending Attestation Note     Date of evaluation: 12/21/2024    This patient was seen by the resident.  I have seen and examined the patient, agree with the workup, evaluation, management and diagnosis. The care plan has been discussed.  My assessment reveals well-appearing male in no acute distress who presents emergency department for neck pain and headache.  Reports that he yesterday was moving a box when he developed pain starting in his neck going up his neck towards his head.  Patient does not have meningismus on my examination no midline tenderness and full no focal tenderness to palpation.  He does not have neurodeficits and ambulates without difficulty.  Is low risk for subarachnoid hemorrhage in his presentation and the absence of fever as well as the inciting incident is inconsistent with meningitis.  Patient treated with muscle relaxer NSAID as well as analgesia.  On repeat assessment improved.  Will discharge with outpatient follow-up.        Linn Bacon MD  12/21/24 1317

## 2025-01-20 ENCOUNTER — APPOINTMENT (OUTPATIENT)
Dept: CT IMAGING | Age: 25
End: 2025-01-20
Payer: COMMERCIAL

## 2025-01-20 ENCOUNTER — HOSPITAL ENCOUNTER (EMERGENCY)
Age: 25
Discharge: HOME | End: 2025-01-20
Attending: EMERGENCY MEDICINE
Payer: COMMERCIAL

## 2025-01-20 VITALS
WEIGHT: 270 LBS | SYSTOLIC BLOOD PRESSURE: 133 MMHG | TEMPERATURE: 98.4 F | BODY MASS INDEX: 39.99 KG/M2 | DIASTOLIC BLOOD PRESSURE: 89 MMHG | HEIGHT: 69 IN | OXYGEN SATURATION: 99 % | RESPIRATION RATE: 16 BRPM | HEART RATE: 96 BPM

## 2025-01-20 DIAGNOSIS — G44.019 EPISODIC CLUSTER HEADACHE, NOT INTRACTABLE: ICD-10-CM

## 2025-01-20 DIAGNOSIS — J01.10 ACUTE NON-RECURRENT FRONTAL SINUSITIS: Primary | ICD-10-CM

## 2025-01-20 LAB
FLUAV RNA RESP QL NAA+PROBE: NOT DETECTED
FLUBV RNA RESP QL NAA+PROBE: NOT DETECTED
SARS-COV-2 RNA RESP QL NAA+PROBE: NOT DETECTED

## 2025-01-20 PROCEDURE — 6360000002 HC RX W HCPCS: Performed by: EMERGENCY MEDICINE

## 2025-01-20 PROCEDURE — 70450 CT HEAD/BRAIN W/O DYE: CPT

## 2025-01-20 PROCEDURE — 87636 SARSCOV2 & INF A&B AMP PRB: CPT

## 2025-01-20 PROCEDURE — 6370000000 HC RX 637 (ALT 250 FOR IP)

## 2025-01-20 PROCEDURE — 96372 THER/PROPH/DIAG INJ SC/IM: CPT

## 2025-01-20 PROCEDURE — 99284 EMERGENCY DEPT VISIT MOD MDM: CPT

## 2025-01-20 RX ORDER — DIPHENHYDRAMINE HYDROCHLORIDE 50 MG/ML
12.5 INJECTION INTRAMUSCULAR; INTRAVENOUS ONCE
Status: DISCONTINUED | OUTPATIENT
Start: 2025-01-20 | End: 2025-01-20

## 2025-01-20 RX ORDER — PROCHLORPERAZINE EDISYLATE 5 MG/ML
10 INJECTION INTRAMUSCULAR; INTRAVENOUS ONCE
Status: DISCONTINUED | OUTPATIENT
Start: 2025-01-20 | End: 2025-01-20

## 2025-01-20 RX ORDER — KETOROLAC TROMETHAMINE 30 MG/ML
30 INJECTION, SOLUTION INTRAMUSCULAR; INTRAVENOUS ONCE
Status: COMPLETED | OUTPATIENT
Start: 2025-01-20 | End: 2025-01-20

## 2025-01-20 RX ORDER — PROCHLORPERAZINE EDISYLATE 5 MG/ML
10 INJECTION INTRAMUSCULAR; INTRAVENOUS ONCE
Status: COMPLETED | OUTPATIENT
Start: 2025-01-20 | End: 2025-01-20

## 2025-01-20 RX ORDER — METHOCARBAMOL 750 MG/1
750 TABLET, FILM COATED ORAL 4 TIMES DAILY
Qty: 40 TABLET | Refills: 0 | Status: SHIPPED | OUTPATIENT
Start: 2025-01-20 | End: 2025-01-30

## 2025-01-20 RX ORDER — SODIUM CHLORIDE, SODIUM LACTATE, POTASSIUM CHLORIDE, AND CALCIUM CHLORIDE .6; .31; .03; .02 G/100ML; G/100ML; G/100ML; G/100ML
1000 INJECTION, SOLUTION INTRAVENOUS ONCE
Status: DISCONTINUED | OUTPATIENT
Start: 2025-01-20 | End: 2025-01-20

## 2025-01-20 RX ORDER — KETOROLAC TROMETHAMINE 30 MG/ML
15 INJECTION, SOLUTION INTRAMUSCULAR; INTRAVENOUS ONCE
Status: DISCONTINUED | OUTPATIENT
Start: 2025-01-20 | End: 2025-01-20

## 2025-01-20 RX ORDER — ALBUTEROL SULFATE 90 UG/1
2 INHALANT RESPIRATORY (INHALATION) EVERY 6 HOURS PRN
Qty: 18 G | Refills: 1 | Status: SHIPPED | OUTPATIENT
Start: 2025-01-20

## 2025-01-20 RX ADMIN — KETOROLAC TROMETHAMINE 30 MG: 30 INJECTION, SOLUTION INTRAMUSCULAR at 22:13

## 2025-01-20 RX ADMIN — AMOXICILLIN AND CLAVULANATE POTASSIUM 1 TABLET: 875; 125 TABLET, FILM COATED ORAL at 22:34

## 2025-01-20 RX ADMIN — PROCHLORPERAZINE EDISYLATE 10 MG: 5 INJECTION INTRAMUSCULAR; INTRAVENOUS at 22:14

## 2025-01-20 ASSESSMENT — ENCOUNTER SYMPTOMS
PHOTOPHOBIA: 1
NAUSEA: 0
CONSTIPATION: 0
SORE THROAT: 0
VOMITING: 0
ABDOMINAL DISTENTION: 0
DIARRHEA: 0
SHORTNESS OF BREATH: 0
BACK PAIN: 0
WHEEZING: 0
EYE REDNESS: 0
COUGH: 0
EYE PAIN: 0

## 2025-01-20 ASSESSMENT — PAIN DESCRIPTION - DESCRIPTORS
DESCRIPTORS: PRESSURE;THROBBING;TIGHTNESS
DESCRIPTORS: ACHING

## 2025-01-20 ASSESSMENT — PAIN SCALES - GENERAL
PAINLEVEL_OUTOF10: 6
PAINLEVEL_OUTOF10: 7

## 2025-01-20 ASSESSMENT — LIFESTYLE VARIABLES
HOW MANY STANDARD DRINKS CONTAINING ALCOHOL DO YOU HAVE ON A TYPICAL DAY: 1 OR 2
HOW OFTEN DO YOU HAVE A DRINK CONTAINING ALCOHOL: MONTHLY OR LESS

## 2025-01-20 ASSESSMENT — PAIN DESCRIPTION - LOCATION
LOCATION: HEAD
LOCATION: HEAD

## 2025-01-20 ASSESSMENT — PAIN DESCRIPTION - PAIN TYPE: TYPE: ACUTE PAIN

## 2025-01-20 ASSESSMENT — PAIN DESCRIPTION - ORIENTATION: ORIENTATION: LEFT

## 2025-01-20 ASSESSMENT — PAIN - FUNCTIONAL ASSESSMENT: PAIN_FUNCTIONAL_ASSESSMENT: 0-10

## 2025-01-21 NOTE — ED PROVIDER NOTES
THE [facility]  EMERGENCY DEPARTMENT ENCOUNTER          [facility] RESIDENT NOTE       Date of evaluation: 1/20/2025    Chief Complaint     Headache (Patient stated that he has had a headache x 2-3 weeks, pain has worsened since being seen here for the same pain, pain in the back of he head, neck pain, and above the eyes, photosensitivity, worse in the morning, denies blurred vision. )      History of Present Illness     Salvador Saleh is a 24 y.o. male without significant medical history who presents with continued neck pain and daily headaches for the past 3 weeks.    Notably he was seen here back on 12/21/2024 with similar symptoms.  At that point in time he was having neck pain that started after he was lifting heavy boxes at work.  He describes pain as a tightness that was worse when he rotates or flexes/extends his neck.  No red flag symptoms at that time per chart review.  He was treated with Tylenol, Toradol, Robaxin with improvement in his symptoms.  He was discharged with an outpatient prescription for Robaxin and a PCP follow-up.    Patient states that since then he has had improvements in his neck tightness, although it is still there, however he has been having daily headaches for the past 3 weeks.  He states that they are worse in the morning, 10/10, bilateral frontal region of the skull.  They are also aggravated by flexing/extending or rotating his neck or picking up heavy boxes at work.  He states that when he is home and not exerting himself his headache is manageable.  He states that the Robaxin that he was sent help with has not helped much, he has also tried 200 mg of ibuprofen for each headache without much help.  He denies red flag signs like weakness, numbness/tingling in his upper extremities, no vision changes or vision loss, no preceding trauma to the head or prior intracranial or cervical surgeries.    Otherwise he is also complaining of some nasal congestion that fluctuates with his

## 2025-01-21 NOTE — DISCHARGE INSTRUCTIONS
You were evaluated today for your neck tightness, headaches, and nasal congestion.  We performed a head CT which showed no signs of a brain mass.  We believe that your headache is likely due to a combination of factors, one being your neck tightness, and the other being your sinus infection.  We are prescribing you with 5 total days of Augmentin, an antibiotic.  Please take this as prescribed.  Do not stop taking it early if you start to feel better.    You already have an appointment scheduled with a primary care doctor in about a week, please keep this visit, as this physician will be helpful with continued prescription management as well as setting you up with a physical therapist to help with your neck tightness.  If your symptoms worsen, if you start to have difficulty breathing, chest pain, or any other symptom that you feel needs emergent evaluation by physician please return to the ED.

## 2025-01-27 ENCOUNTER — TELEPHONE (OUTPATIENT)
Dept: PRIMARY CARE CLINIC | Age: 25
End: 2025-01-27

## 2025-07-17 ENCOUNTER — HOSPITAL ENCOUNTER (EMERGENCY)
Age: 25
Discharge: HOME | End: 2025-07-17
Attending: STUDENT IN AN ORGANIZED HEALTH CARE EDUCATION/TRAINING PROGRAM
Payer: COMMERCIAL

## 2025-07-17 VITALS
DIASTOLIC BLOOD PRESSURE: 82 MMHG | WEIGHT: 268.96 LBS | SYSTOLIC BLOOD PRESSURE: 141 MMHG | OXYGEN SATURATION: 98 % | RESPIRATION RATE: 16 BRPM | TEMPERATURE: 98.2 F | BODY MASS INDEX: 39.72 KG/M2 | HEART RATE: 69 BPM

## 2025-07-17 DIAGNOSIS — K08.89 PAIN, DENTAL: Primary | ICD-10-CM

## 2025-07-17 PROCEDURE — 99283 EMERGENCY DEPT VISIT LOW MDM: CPT

## 2025-07-17 PROCEDURE — 6370000000 HC RX 637 (ALT 250 FOR IP)

## 2025-07-17 RX ORDER — ALBUTEROL SULFATE 90 UG/1
2 INHALANT RESPIRATORY (INHALATION) 4 TIMES DAILY PRN
Qty: 18 G | Refills: 0 | Status: SHIPPED | OUTPATIENT
Start: 2025-07-17

## 2025-07-17 RX ORDER — ACETAMINOPHEN 325 MG/1
650 TABLET ORAL ONCE
Status: COMPLETED | OUTPATIENT
Start: 2025-07-17 | End: 2025-07-17

## 2025-07-17 RX ORDER — PENICILLIN V POTASSIUM 500 MG/1
500 TABLET, FILM COATED ORAL 4 TIMES DAILY
Qty: 40 TABLET | Refills: 0 | Status: SHIPPED | OUTPATIENT
Start: 2025-07-17 | End: 2025-07-27

## 2025-07-17 RX ORDER — ACETAMINOPHEN 500 MG
500 TABLET ORAL 4 TIMES DAILY PRN
Qty: 120 TABLET | Refills: 0 | Status: SHIPPED | OUTPATIENT
Start: 2025-07-17

## 2025-07-17 RX ORDER — IBUPROFEN 600 MG/1
600 TABLET, FILM COATED ORAL 3 TIMES DAILY PRN
Qty: 30 TABLET | Refills: 0 | Status: SHIPPED | OUTPATIENT
Start: 2025-07-17

## 2025-07-17 RX ORDER — KETOROLAC TROMETHAMINE 30 MG/ML
15 INJECTION, SOLUTION INTRAMUSCULAR; INTRAVENOUS ONCE
Status: DISCONTINUED | OUTPATIENT
Start: 2025-07-17 | End: 2025-07-17 | Stop reason: HOSPADM

## 2025-07-17 RX ORDER — PENICILLIN V POTASSIUM 500 MG/1
500 TABLET, FILM COATED ORAL ONCE
Status: COMPLETED | OUTPATIENT
Start: 2025-07-17 | End: 2025-07-17

## 2025-07-17 RX ADMIN — PENICILLIN V POTASSIUM 500 MG: 500 TABLET, FILM COATED ORAL at 19:36

## 2025-07-17 RX ADMIN — ACETAMINOPHEN 650 MG: 325 TABLET ORAL at 19:36

## 2025-07-17 ASSESSMENT — LIFESTYLE VARIABLES
HOW OFTEN DO YOU HAVE A DRINK CONTAINING ALCOHOL: NEVER
HOW MANY STANDARD DRINKS CONTAINING ALCOHOL DO YOU HAVE ON A TYPICAL DAY: PATIENT DOES NOT DRINK

## 2025-07-17 ASSESSMENT — PAIN SCALES - GENERAL: PAINLEVEL_OUTOF10: 8

## 2025-07-17 NOTE — DISCHARGE INSTRUCTIONS
You were seen in the ED for dental pain. It looks like you need your wisdom teeth removed. I will start you on penicillin which you should take 4 times a day for the next 10 days. A list of dental clinics is below. Please follow up with a dentist and primary care doctor. A phone number for primary care doctors is listed above. You will be given ibuprofen/tylenol to take as needed for pain. You will be given a refill for your albuterol inhaler as well. Please return to the ED if you develop fever, difficulty swallowing, difficulty breathing, or difficulty managing your spit, or any other new/worsening symptoms.    If you have Medicaid Insurance:   Your health plan can help you make a next day or same day dental appointment.  The cost of this appointment is covered by your regular health plan benefits!  Please call the below number for your health plan during regular business hours to make a dental appointment.    [facility]   [phone]  [facility]      [phone]  [facility].   [phone]  [facility]     [phone]  [place]     [phone]    If you have trouble getting services through your Medicaid provider, please feel free to call the Medicaid Hotline at 162-681-3330.      [facility]  Resources:     [facility]  [address] [place], [place] [id number] [phone]   Monday-Thursday 8am-5pm Friday 8am-2pm   No residency requirements   Covers patients up to age 14   Accepts Medicaid, Insurance, Self-Pay    [facility]   [address] [place], [place] [id number] [phone]  Monday-Friday 9:30am-5:30pm No residency requirements   Accepts Medicaid, Insurance, Self-Pay    [facility]  [address] [place] [address] [phone]   Monday-Friday 8am-5pm Appointment Only No residency requirements  Accepts Medicaid, Insurance, Self-Pay  [facility]  [address]  of all services.   Minimum Co-pay must be $20  Medicaid, Insurance, Self-Pay     [facility]  [address] [place], [place] [id number] [phone]   Monday -Thursday  6:30a.m. - 8p.m. Friday  6:30a.m. - 5p.m.   Emergencies Monday-Thursday Every morning at 6:30am & Every afternoon at 3:30pm   Must be a resident of the [place]; Bring proof of this residence (example: utility bill);   Sliding Fee Scale  *Scale requires proof of income (example: pay stub or tax return). Scale is based on family size and income. Discounts can be 25%, 50%, 75%, or 100% of all services.   Minimum Co-pay must be $20  Medicaid, Insurance, Self-Pay     [facility]  [address]   [place], [place] [id number] [phone] Monday - Friday 7:30a.m. - 5p.m.   Emergencies Monday-Thursday  Must be at facility at 7:30am for emergency care   Must be a resident of the [place]; Bring proof of this residence (example: utility bill);   Sliding Fee Scale  *Scale requires proof of income (example: pay stub or tax return). Scale is based on family size and income. Discounts can be 25%, 50%, 75%, or 100% of all services.   Minimum Co-pay must be $20  Medicaid, Insurance, Self-Pay      [facility]--[address] [address] [phone]   Monday 8:30am-7pm; Tuesday & Wednesday 8am-5pm; Thursday 8am-7pm; Friday 8:30am-4pm Appointment Only   No residency requirements   Sliding Fee Scale  *Scale requires proof of income (example: pay stub or tax return). Scale is based on family size and income. Discounts can be 25%, 50%, 75%, or 100% of all services.   Accepts Medicaid, Insurance, Self-Pay     [facility]  [address] [place] [address] [phone]  Monday 8am-7pm; Tuesday 8am-6pm; Wednesday 8am-5pm; Thursday 8am-7:30pm; Friday 7:00pm-4pm  No residency requirements   Sliding Fee Scale  *Scale requires proof of

## 2025-07-17 NOTE — ED PROVIDER NOTES
THE [facility]  EMERGENCY DEPARTMENT ENCOUNTER          EM RESIDENT NOTE       Date of evaluation: 7/17/2025    Chief Complaint     Dental Pain (Patient stated that he started having left lower wisdom tooth pain when he woke up this morning, stated that he had an infection last year but has not had the time to schedule removal. )      History of Present Illness     Salvador Saleh is a 25 y.o. male with past medical history of asthma who presents to the ED with a chief complaint of dental pain. Patient reports lower wisdom teeth pain left greater than right that started upon awakening today. He reports that he has had issues with his wisdom teeth before and is supposed to get them removed, but does not have a dentist currently. He describes his pain as a burning sensation. Denies any facial trauma/injury. Denies fever, swelling, or pus drainage. Denies difficulty managing secretions, difficulty swallowing, or difficulty breathing.     MEDICAL DECISION MAKING / ASSESSMENT / PLAN     INITIAL VITALS: BP: (!) 141/82, Temp: 98.2 °F (36.8 °C), Pulse: 69, Respirations: 16, SpO2: 98 %    Patient is a 25 y.o. male presenting with dental pain x1 day. Initial vital signs significant for HTN. Exam shows bilateral lower wisdom teeth that appear impacted with no obvious signs of infection. No airway compromise on exam. No noted focal CN deficits. Patient given Tylenol and penicillin VK due to impaction. He was discharged with prescriptions for Tylenol, ibuprofen, and 10 day course of penicillin VK in addition to an albuterol inhaler refill per his request. Patient provided list of dental clinics and contact information to obtain PCP. Work note provided. ED return precautions were given. Patient encouraged to follow up with PCP. Patient verbalized understanding. All questions answered. Patient discharged in stable condition.    Is this patient to be included in the SEP-1 core measure? No Exclusion criteria - the patient is

## 2025-07-18 NOTE — ED PROVIDER NOTES
ED Attending Attestation Note     Date of evaluation: 7/17/2025    This patient was seen by the resident.  I have seen and examined the patient, agree with the workup, evaluation, management and diagnosis. The care plan has been discussed.     This is a 25-year-old M with a PMHx of asthma presenting with left lower dental pain that has been present intermittent x1 year but worse throughout the day today.    On my assessment, he is resting comfortably in no acute distress. Vitals reassuring. Reports improvement in his pain after PO tylenol here. On exam, he is phonating without difficulty and tolerating his secretions. No trismus. He has impacted wisdom teeth bilaterally that are starting to come through the gums. On the left lower wisdom tooth, this is causing some superficial irritation to the adjacent buccal mucosa. Mild surrounding gingival erythema but no induration or fluctuance to suggest associated abscess, floor of the mouth is soft. Will treat symptomatically and plan for dental and OMFS follow-up.        Sandy De La Rosa MD  07/18/25 [id number]